# Patient Record
Sex: MALE | Race: OTHER | NOT HISPANIC OR LATINO | URBAN - METROPOLITAN AREA
[De-identification: names, ages, dates, MRNs, and addresses within clinical notes are randomized per-mention and may not be internally consistent; named-entity substitution may affect disease eponyms.]

---
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
## 2021-10-16 ENCOUNTER — TRANSCRIPTION ENCOUNTER (OUTPATIENT)
Age: 21
End: 2021-10-16

## 2023-01-26 ENCOUNTER — EMERGENCY (EMERGENCY)
Facility: HOSPITAL | Age: 23
LOS: 1 days | Discharge: HOME | End: 2023-01-26
Attending: EMERGENCY MEDICINE
Payer: COMMERCIAL

## 2023-01-26 VITALS
DIASTOLIC BLOOD PRESSURE: 84 MMHG | SYSTOLIC BLOOD PRESSURE: 124 MMHG | OXYGEN SATURATION: 99 % | HEIGHT: 71 IN | RESPIRATION RATE: 20 BRPM | WEIGHT: 104.94 LBS | TEMPERATURE: 98 F | HEART RATE: 74 BPM

## 2023-01-26 VITALS
RESPIRATION RATE: 18 BRPM | DIASTOLIC BLOOD PRESSURE: 62 MMHG | HEART RATE: 71 BPM | OXYGEN SATURATION: 98 % | SYSTOLIC BLOOD PRESSURE: 126 MMHG

## 2023-01-26 DIAGNOSIS — F43.23 ADJUSTMENT DISORDER WITH MIXED ANXIETY AND DEPRESSED MOOD: ICD-10-CM

## 2023-01-26 LAB
ALBUMIN SERPL ELPH-MCNC: 4.5 G/DL — SIGNIFICANT CHANGE UP (ref 3.3–5)
ALP SERPL-CCNC: 77 U/L — SIGNIFICANT CHANGE UP (ref 40–120)
ALT FLD-CCNC: 9 U/L — LOW (ref 10–45)
AMPHET UR-MCNC: NEGATIVE — SIGNIFICANT CHANGE UP
ANION GAP SERPL CALC-SCNC: 14 MMOL/L — SIGNIFICANT CHANGE UP (ref 5–17)
APAP SERPL-MCNC: <15 UG/ML — SIGNIFICANT CHANGE UP (ref 10–30)
APPEARANCE UR: CLEAR — SIGNIFICANT CHANGE UP
AST SERPL-CCNC: 12 U/L — SIGNIFICANT CHANGE UP (ref 10–40)
BARBITURATES UR SCN-MCNC: NEGATIVE — SIGNIFICANT CHANGE UP
BASOPHILS # BLD AUTO: 0.01 K/UL — SIGNIFICANT CHANGE UP (ref 0–0.2)
BASOPHILS NFR BLD AUTO: 0.2 % — SIGNIFICANT CHANGE UP (ref 0–2)
BENZODIAZ UR-MCNC: NEGATIVE — SIGNIFICANT CHANGE UP
BILIRUB SERPL-MCNC: 1 MG/DL — SIGNIFICANT CHANGE UP (ref 0.2–1.2)
BILIRUB UR-MCNC: NEGATIVE — SIGNIFICANT CHANGE UP
BUN SERPL-MCNC: 7 MG/DL — SIGNIFICANT CHANGE UP (ref 7–23)
CALCIUM SERPL-MCNC: 9.6 MG/DL — SIGNIFICANT CHANGE UP (ref 8.4–10.5)
CHLORIDE SERPL-SCNC: 99 MMOL/L — SIGNIFICANT CHANGE UP (ref 96–108)
CO2 SERPL-SCNC: 25 MMOL/L — SIGNIFICANT CHANGE UP (ref 22–31)
COCAINE METAB.OTHER UR-MCNC: NEGATIVE — SIGNIFICANT CHANGE UP
COLOR SPEC: YELLOW — SIGNIFICANT CHANGE UP
COVID-19 SPIKE DOMAIN AB INTERP: POSITIVE
COVID-19 SPIKE DOMAIN ANTIBODY RESULT: >250 U/ML — HIGH
CREAT SERPL-MCNC: 0.77 MG/DL — SIGNIFICANT CHANGE UP (ref 0.5–1.3)
DIFF PNL FLD: NEGATIVE — SIGNIFICANT CHANGE UP
EGFR: 130 ML/MIN/1.73M2 — SIGNIFICANT CHANGE UP
EOSINOPHIL # BLD AUTO: 0.08 K/UL — SIGNIFICANT CHANGE UP (ref 0–0.5)
EOSINOPHIL NFR BLD AUTO: 1.8 % — SIGNIFICANT CHANGE UP (ref 0–6)
ETHANOL SERPL-MCNC: <10 MG/DL — SIGNIFICANT CHANGE UP (ref 0–10)
GLUCOSE SERPL-MCNC: 86 MG/DL — SIGNIFICANT CHANGE UP (ref 70–99)
GLUCOSE UR QL: NEGATIVE — SIGNIFICANT CHANGE UP
HCT VFR BLD CALC: 39 % — SIGNIFICANT CHANGE UP (ref 39–50)
HGB BLD-MCNC: 12.4 G/DL — LOW (ref 13–17)
IMM GRANULOCYTES NFR BLD AUTO: 0.4 % — SIGNIFICANT CHANGE UP (ref 0–0.9)
KETONES UR-MCNC: ABNORMAL
LEUKOCYTE ESTERASE UR-ACNC: NEGATIVE — SIGNIFICANT CHANGE UP
LYMPHOCYTES # BLD AUTO: 1.45 K/UL — SIGNIFICANT CHANGE UP (ref 1–3.3)
LYMPHOCYTES # BLD AUTO: 31.7 % — SIGNIFICANT CHANGE UP (ref 13–44)
MCHC RBC-ENTMCNC: 27.9 PG — SIGNIFICANT CHANGE UP (ref 27–34)
MCHC RBC-ENTMCNC: 31.8 GM/DL — LOW (ref 32–36)
MCV RBC AUTO: 87.8 FL — SIGNIFICANT CHANGE UP (ref 80–100)
METHADONE UR-MCNC: NEGATIVE — SIGNIFICANT CHANGE UP
MONOCYTES # BLD AUTO: 0.37 K/UL — SIGNIFICANT CHANGE UP (ref 0–0.9)
MONOCYTES NFR BLD AUTO: 8.1 % — SIGNIFICANT CHANGE UP (ref 2–14)
NEUTROPHILS # BLD AUTO: 2.64 K/UL — SIGNIFICANT CHANGE UP (ref 1.8–7.4)
NEUTROPHILS NFR BLD AUTO: 57.8 % — SIGNIFICANT CHANGE UP (ref 43–77)
NITRITE UR-MCNC: NEGATIVE — SIGNIFICANT CHANGE UP
NRBC # BLD: 0 /100 WBCS — SIGNIFICANT CHANGE UP (ref 0–0)
OPIATES UR-MCNC: NEGATIVE — SIGNIFICANT CHANGE UP
OXYCODONE UR-MCNC: NEGATIVE — SIGNIFICANT CHANGE UP
PCP SPEC-MCNC: SIGNIFICANT CHANGE UP
PCP UR-MCNC: NEGATIVE — SIGNIFICANT CHANGE UP
PH UR: 6.5 — SIGNIFICANT CHANGE UP (ref 5–8)
PLATELET # BLD AUTO: 287 K/UL — SIGNIFICANT CHANGE UP (ref 150–400)
POTASSIUM SERPL-MCNC: 3.9 MMOL/L — SIGNIFICANT CHANGE UP (ref 3.5–5.3)
POTASSIUM SERPL-SCNC: 3.9 MMOL/L — SIGNIFICANT CHANGE UP (ref 3.5–5.3)
PROT SERPL-MCNC: 6.9 G/DL — SIGNIFICANT CHANGE UP (ref 6–8.3)
PROT UR-MCNC: SIGNIFICANT CHANGE UP
RBC # BLD: 4.44 M/UL — SIGNIFICANT CHANGE UP (ref 4.2–5.8)
RBC # FLD: 12 % — SIGNIFICANT CHANGE UP (ref 10.3–14.5)
SALICYLATES SERPL-MCNC: <2 MG/DL — LOW (ref 15–30)
SARS-COV-2 IGG+IGM SERPL QL IA: >250 U/ML — HIGH
SARS-COV-2 IGG+IGM SERPL QL IA: POSITIVE
SARS-COV-2 RNA SPEC QL NAA+PROBE: SIGNIFICANT CHANGE UP
SODIUM SERPL-SCNC: 138 MMOL/L — SIGNIFICANT CHANGE UP (ref 135–145)
SP GR SPEC: 1.02 — SIGNIFICANT CHANGE UP (ref 1.01–1.02)
THC UR QL: NEGATIVE — SIGNIFICANT CHANGE UP
UROBILINOGEN FLD QL: NEGATIVE — SIGNIFICANT CHANGE UP
WBC # BLD: 4.57 K/UL — SIGNIFICANT CHANGE UP (ref 3.8–10.5)
WBC # FLD AUTO: 4.57 K/UL — SIGNIFICANT CHANGE UP (ref 3.8–10.5)

## 2023-01-26 PROCEDURE — 81003 URINALYSIS AUTO W/O SCOPE: CPT

## 2023-01-26 PROCEDURE — 90792 PSYCH DIAG EVAL W/MED SRVCS: CPT

## 2023-01-26 PROCEDURE — 99285 EMERGENCY DEPT VISIT HI MDM: CPT

## 2023-01-26 PROCEDURE — 93005 ELECTROCARDIOGRAM TRACING: CPT

## 2023-01-26 PROCEDURE — U0003: CPT

## 2023-01-26 PROCEDURE — 80053 COMPREHEN METABOLIC PANEL: CPT

## 2023-01-26 PROCEDURE — 85025 COMPLETE CBC W/AUTO DIFF WBC: CPT

## 2023-01-26 PROCEDURE — U0005: CPT

## 2023-01-26 PROCEDURE — 80307 DRUG TEST PRSMV CHEM ANLYZR: CPT

## 2023-01-26 PROCEDURE — 86769 SARS-COV-2 COVID-19 ANTIBODY: CPT

## 2023-01-26 RX ORDER — DIAZEPAM 5 MG
1 TABLET ORAL
Qty: 7 | Refills: 0
Start: 2023-01-26 | End: 2023-02-01

## 2023-01-26 NOTE — ED ADULT NURSE NOTE - NSIMPLEMENTINTERV_GEN_ALL_ED
Implemented All Universal Safety Interventions:  [place] to call system. Call bell, personal items and telephone within reach. Instruct patient to call for assistance. Room bathroom lighting operational. Non-slip footwear when patient is off stretcher. Physically safe environment: no spills, clutter or unnecessary equipment. Stretcher in lowest position, wheels locked, appropriate side rails in place.

## 2023-01-26 NOTE — ED ADULT NURSE NOTE - OBJECTIVE STATEMENT
22 year old male BIB mon from home, no PPHx, c/.o distress, sleeplessness  from breaking up a week ago with his girlfriend. Pt relationship with his HS GF since he was 14 years old, engaged with GF in december and moved her into their home, and was engaged in July. Pt said he has not been sleeping because of the break up and feels isolated because she left and say he doesn't have any friends. Pt said he is upset but denied suicidal thoughts, denied suicidal thoughts,  denied any psychotic symptoms, denied alcohol and substance abuse issues. Pt is domiciled with parents and sister.

## 2023-01-26 NOTE — ED BEHAVIORAL HEALTH ASSESSMENT NOTE - SUMMARY
Patient is a 21 yo M, domiciled with family, employed, with no pmh p/w c/o anxiety and depression and no PPH. Patient presents to the ED as patient has been depressed for the past week after fiance broke off patient's engagement. Psychiatry was consulted as patient is depressed, had passive SI in the past, and cut his left forearm on Tuesday.  Upon interview on bedside, patient is calm, cooperative, and AOX3.  Patient states that his fiance called off their engagement last week due to relationship conflicts. Patient states he has been depressed for one week now, patient feels guilty and hopeless about his relationship. Patient states he has said hurtful statements to his partner which is why he feels guilty. Patient was in a 8 year relationship, got engaged last year, and patient's partner had been living with him for one year now. Patient states he is still in contact with his ex-partner and states that," She says a part of me wants you back and part of me would make me look like a fool with you." Patient states he scratched himself on the forearm on Tuesday after talking to his partner.  Patient states that this has been giving him anxiety. Patient admits to having poor sleep and a poor appetite. Patient states that he would benefit with from a therapist. Patient denies any manic, psychosis, current SI/HI intent or plan. Patient denies any substance abuse. Patient does not have any access to firearms or any guns.     Plan:  1) Valium 5mg PRN  2) Patient is amenable to outpatient psychiatry treatment  3) Follow up with  for referall for patient. Patient is a 21 yo M, domiciled with family, employed, with no pmh p/w c/o anxiety and depression and no PPH. Patient presents to the ED as patient has been depressed for the past week after fiance broke off patient's engagement. Psychiatry was consulted as patient is depressed, had passive SI in the past, and cut his left forearm on Tuesday.  Upon interview on bedside, patient is calm, cooperative, and AOX3.  Patient states that his fiance called off their engagement last week due to relationship conflicts. Patient states he has been depressed for one week now, patient feels guilty and hopeless about his relationship. Patient states he has said hurtful statements to his partner which is why he feels guilty. Patient was in a 8 year relationship, got engaged last year, and patient's partner had been living with him for one year now. Patient states he is still in contact with his ex-partner and states that," She says a part of me wants you back and part of me would make me look like a fool with you." Patient states he scratched himself on the forearm on Tuesday after talking to his partner.  Patient states that this has been giving him anxiety. Patient admits to having poor sleep and a poor appetite. Patient states that he would benefit with from a therapist. Patient denies any manic, psychosis, current SI/HI intent or plan. Patient denies any substance abuse. Patient does not have any access to firearms or any guns.     Plan:  1) Valium 2mg p.o. at bedtime PRN for insomnia/anxiety x 7 days  2) Patient is amenable to outpatient psychiatry treatment  3) Follow up with  for referral for patient.

## 2023-01-26 NOTE — CHART NOTE - NSCHARTNOTEFT_GEN_A_CORE
EMERGENCY ROOM  Social work was notified by psychiatry that patient is psychiatrically cleared for discharge and requesting outpatient resources . Social work reviewed patient’s chart, currently in the ED. As per chart review, ” 21 yo M with no pmh p/w c/o anxiety and depression. PT had emotional distress after his fiancee broke off their engagement. Pt states he cannot sleep. He has lost interest in doing things. Per his mom, he is not eating and less interactive. Pt states he cut his left arm. He has done this once prior a year ago. At that time it was because he hurt his fiance's feelings and he wanted to take her hurt away. Pt denies current SI, though he admits to passive SI in the past. He has never had a plan. He denies HI. No hx of hospitalizations, he has followed with a mental health professional. No fever, chills, nausea, vomiting. Pt states he has few friends and the only person he trusted was his fiancé. “  LMSW met with patient at bedside and patient’s mother, Joseph Bower ph: [phone] and introduced self and role. Patient is AxOx4 and confirmed demographic information. Patient denied current AH/VH SI/HI. Patient identified his mother as his emergency contact.  LMSW provided patient with [facility] crisis center contact information along with outpatient MH clinics.  LMSW answered questions that patient’s mother had regarding how to follow up with crisis center. Mother and patient were grateful for information and resources. They had no further questions nor concerns. Patient agrees with discharge plan. Psychiatry made aware of consultation. Social work will remain available.

## 2023-01-26 NOTE — ED BEHAVIORAL HEALTH ASSESSMENT NOTE - SAFETY PLAN COMPLETED
Patient would like a call from Josy Barraza regarding her cholesterol medication she can be reached @ [phone] Yes

## 2023-01-26 NOTE — ED PROVIDER NOTE - NSFOLLOWUPINSTRUCTIONS_ED_ALL_ED_FT
Take 2 mg of valium at bedtime for the next week.    You were provided with the information of our crisis center. Please follow up their to continue your care.     What is depression?  Depression is a medical condition that causes feelings of sadness or hopelessness that do not go away. Depression may cause you to lose interest in things you used to enjoy. These feelings may interfere with your daily life.      What causes or increases my risk for depression?  Depression may be caused by changes in brain chemicals that affect your mood. Your risk for depression may be higher if you have any of the following:    Stressful events such as the death of a loved one, unemployment, or divorce  A family history of depression  A chronic medical condition, such as diabetes, heart disease, or cancer  Drug or alcohol abuse  What are the signs and symptoms of depression?  Appetite changes, or weight gain or loss  Trouble going to sleep or staying asleep, or sleeping too much  Fatigue or lack of energy  Feeling restless, irritable, or withdrawn  Feeling worthless, hopeless, discouraged, or guilty  Trouble concentrating, remembering things, doing daily tasks, or making decisions  Thoughts about hurting or killing yourself  How is depression diagnosed?  Your healthcare provider will ask about your symptoms and how long you have had them. He or she will ask if you have any family members with depression. Tell your healthcare provider about any stressful events in your life. He or she may ask about any other health conditions or medicines you take.    How is depression treated?  Therapy is often used together with medicine. Therapy is a way for you to talk about your feelings and anything that may be causing depression. Therapy can be done alone or in a group. It may also be done with family members or a significant other.  Antidepressant medicine may be given to relieve depression. You may need to take this medicine for several weeks before you begin to feel better. Tell your healthcare provider about any side effects or problems you have with your medicine. The type or amount of medicine may need to be changed.

## 2023-01-26 NOTE — ED ADULT NURSE NOTE - NS SC CAGE ALCOHOL EYE OPENER
TRANSFER - OUT REPORT:    Verbal report given to [facility], RN (name) on Ghassan Calr  being transferred to [address] [place] [address] (unit) for routine progression of care       Report consisted of patients Situation, Background, Assessment and   Recommendations(SBAR). Information from the following report(s) SBAR, Kardex, Intake/Output, MAR and Accordion was reviewed with the receiving nurse. Opportunity for questions and clarification was provided.       Patient transported with:  KUMAR no

## 2023-01-26 NOTE — ED BEHAVIORAL HEALTH ASSESSMENT NOTE - RISK ASSESSMENT
Patient is at overall acute low violence risk and low suicide risk  Protective factors: No prior psychiatric history, supportive family and friends, employment  Risk Factors: current presenting situation, self injurious behavior

## 2023-01-26 NOTE — ED BEHAVIORAL HEALTH ASSESSMENT NOTE - NSBHATTESTCOMMENTATTENDFT_PSY_A_CORE
This is a 22-y.o. [place] M patient, domiciled with family, employed, with no pmh p/w c/o anxiety and depression and no PPH. Patient presents to the ED as patient has been depressed for the past week after fiance broke off patient's engagement. Psychiatry was consulted as patient is depressed, had passive SI in the past, and cut his left forearm on Tuesday.    I have seen and evaluated this patient myself. Chart, labs, meds reviewed. I agree with trainee's assessment and plan. Patient experienced emotional distress associated with break-up; remains future-oriented, functional, working, with plans for the future. No SI/HI. Patient at this time is likely not an imminent threat to self or others and can be discharged to follow up with an outpatient provider (referrals provided) or, in the interim, with the Crisis Center.    Meds: Valium 2mg p.o. at bedtime PRN for anxiety/insomnia x 7 days

## 2023-01-26 NOTE — ED PROVIDER NOTE - CLINICAL SUMMARY MEDICAL DECISION MAKING FREE TEXT BOX
23 yo M with no pmh p/w c/o anxiety and depression. PT had emotional distress after his fiancee broke off their engagement. Differential diagnosis includes but is not limited to depression, acute stress disorder, grief. PT's entire life changed. Given recent events, the dec appetite and insomnia may be an appropriate emotional response. No current SI, however given the behavior of cutting would be more cautious. Pt states he would never commit suicide, denies having a plan. Likely needs resources to see a therapist. Would get screening lab work and consult psych for evaluation.

## 2023-01-26 NOTE — ED BEHAVIORAL HEALTH ASSESSMENT NOTE - DETAILS
None Plan communicated to Dr. Moore Patient instructed to call 911 or return to ED should sx. persist/exacerbate

## 2023-01-26 NOTE — ED PROVIDER NOTE - PATIENT PORTAL LINK FT
You can access the FollowMyHealth Patient Portal offered by [facility] by registering at the following website: [email or web address]. By joining [facility]’s FollowMyHealth portal, you will also be able to view your health information using other applications (apps) compatible with our system.

## 2023-01-26 NOTE — ED BEHAVIORAL HEALTH ASSESSMENT NOTE - REFERRAL / APPOINTMENT DETAILS
Provided with referral packet by SW, alternately, patient can go to the Crisis Center [phone]

## 2023-01-26 NOTE — ED BEHAVIORAL HEALTH ASSESSMENT NOTE - HPI (INCLUDE ILLNESS QUALITY, SEVERITY, DURATION, TIMING, CONTEXT, MODIFYING FACTORS, ASSOCIATED SIGNS AND SYMPTOMS)
Patient is a 23 yo M, domiciled with family, employed, with no pmh p/w c/o anxiety and depression and no PPH. Patient presents to the ED as patient has been depressed for the past week after fiance broke off patient's engagement. Psychiatry was consulted as patient is depressed, had passive SI in the past, and cut his left forearm on Tuesday.  Upon interview on bedside, patient is calm, cooperative, and AOX3.  Patient states that his fiance called off their engagement last week due to relationship conflicts. Patient states he has been depressed for one week now, patient feels guilty and hopeless about his relationship. Patient states he has said hurtful statements to his partner which is why he feels guilty. Patient was in a 8 year relationship, got engaged last year, and patient's partner had been living with him for one year now. Patient states he is still in contact with his ex-partner and states that," She says a part of me wants you back and part of me would make me look like a fool with you." Patient states he scratched himself on the forearm on Tuesday after talking to his partner.  Patient states that this has been giving him anxiety. Patient admits to having poor sleep and a poor appetite. Patient states that he would benefit with from a therapist. Patient denies any manic, psychosis, current SI/HI intent or plan. Patient denies any substance abuse. Patient does not have any access to firearms or any guns.

## 2023-01-26 NOTE — ED PROVIDER NOTE - PROGRESS NOTE DETAILS
Pallavi Monge, PGY-2, EM: paged psychiatry. Pallavi Monge, PGY-2, EM: s/w psych for new consult. they will evaluate the pt.

## 2023-01-26 NOTE — ED PROVIDER NOTE - OBJECTIVE STATEMENT
23 yo M with no pmh p/w c/o anxiety and depression. PT had emotional distress after his firiverae broke off their engagement. Pt states he cannot sleep. He has lost interest in doing things. Per his mom, he is not eating and less interactive. Pt states he cut his left arm. He has done this once prior a year ago. At that time it was because he hurt his firiverae's feelings and he wanted to take her hurt away. Pt denies current SI, though he admits to passive SI in the past. He has never had a plan. He denies HI. No hx of hospitalizations, he has followed with a mental health professional. No fever, chills, nausea, vomiting. Pt states he has few friends and the only person he trusted was his firiverae.

## 2023-01-26 NOTE — ED PROVIDER NOTE - ATTENDING CONTRIBUTION TO CARE
Attending MD Enriquez:  I personally have seen and examined this patient. I have performed a substantive portion of the visit including all aspects of the medical decision making.  Resident note reviewed and agree on plan of care and except where noted.      22-year-old gentleman presenting for evaluation of increasing depression and insomnia after acute stressor of his fiancée breaking up with him recently.  The patient has been cutting his left forearm but he denies any current SI.  No psychiatric hospitalizations in the past.  No recreational drug use.    Vital signs are nonactionable.  Patient is standing up at the edge of the stretcher in no apparent distress.  Good eye contact, normal affect.  Moves all extremity spontaneously.  Normal cardiopulmonary examination.  Several linear healing superficial abrasions to the left forearm.  No surrounding erythema warmth or tenderness.    Patient presenting for evaluation of worsening depression, cutting behavior to left forearm.  No SI.  Overall low risk for acute suicidality but given self-injurious behavior will obtain psychiatric consultation for their independent risk assessment.  There is no apparent acute medical issue.        *The above represents an initial assessment/impression. Please refer to progress notes for potential changes in patient clinical course*

## 2023-01-26 NOTE — ED PROVIDER NOTE - PHYSICAL EXAMINATION
General: WN/WD NAD  Head: Atraumatic  Eyes: EOM grossly in tact, no scleral icterus  ENT: moist mucous membranes  Neurology: A&Ox 3 , nonfocal, VENEGAS x 4  Respiratory: normal respiratory effort  CV: Extremities warm and well perfused  Abdominal: Soft, non-distended  Extremities: No edema  Skin: No rashes, L inner forearm with 3 cm hemostatic superficial lacerations. healed incisions in similar region  Psych: Pt appropriate, answers questions thoughtfully with good insight